# Patient Record
Sex: MALE | Race: WHITE | Employment: OTHER | URBAN - METROPOLITAN AREA
[De-identification: names, ages, dates, MRNs, and addresses within clinical notes are randomized per-mention and may not be internally consistent; named-entity substitution may affect disease eponyms.]

---

## 2019-09-09 ENCOUNTER — OFFICE VISIT (OUTPATIENT)
Dept: SURGERY | Age: 69
End: 2019-09-09

## 2019-09-09 VITALS
DIASTOLIC BLOOD PRESSURE: 72 MMHG | WEIGHT: 203 LBS | HEART RATE: 80 BPM | RESPIRATION RATE: 18 BRPM | SYSTOLIC BLOOD PRESSURE: 109 MMHG | HEIGHT: 73 IN | BODY MASS INDEX: 26.9 KG/M2 | OXYGEN SATURATION: 94 %

## 2019-09-09 DIAGNOSIS — E32.8 THYMIC CYST (HCC): Primary | ICD-10-CM

## 2019-09-09 RX ORDER — OXYBUTYNIN CHLORIDE 10 MG/1
TABLET, EXTENDED RELEASE ORAL
Refills: 6 | COMMUNITY
Start: 2019-07-28

## 2019-09-09 RX ORDER — TAMSULOSIN HYDROCHLORIDE 0.4 MG/1
0.8 CAPSULE ORAL DAILY
COMMUNITY

## 2019-09-09 RX ORDER — PROPRANOLOL HYDROCHLORIDE 60 MG/1
60 TABLET ORAL 2 TIMES DAILY
COMMUNITY
End: 2020-09-08 | Stop reason: ALTCHOICE

## 2019-09-09 NOTE — PROGRESS NOTES
Asked to see Mr Vj Villaseñor re: mediastinal mass    Referred by Dr. Frida Jacob    Mr Vj Villaseñor is a pleasant 81 y/o gentleman with a past medical history significant for HTN, HLD and skin cancer. He presented to ENT with complaints of a neck mass near his sternal notch. Workup of this included a physical exam, Chest CT and an esophagram.  He reports that occasionally he feels pressure when he swallows. Denies acute pain. No Known Allergies    PMHx: HTN, HLD, skin cancer    PSHx: appendectomy, vasectomy    SocHx: quit smoking 2013    Family History   Problem Relation Age of Onset    Lung Cancer Mother     Heart Disease Father     Malignant Hyperthermia Neg Hx     Pseudocholinesterase Deficiency Neg Hx     Delayed Awakening Neg Hx     Post-op Nausea/Vomiting Neg Hx     Emergence Delirium Neg Hx     Post-op Cognitive Dysfunction Neg Hx     Other Neg Hx        Outpatient Medications Marked as Taking for the 9/9/19 encounter (Office Visit) with Dinorah Serrano MD   Medication Sig Dispense Refill    propranolol (INDERAL) 60 mg tablet Take 60 mg by mouth two (2) times a day.  tamsulosin (FLOMAX) 0.4 mg capsule Take 0.4 mg by mouth daily.  oxybutynin chloride XL (DITROPAN XL) 10 mg CR tablet TAKE 1 TABLET BY MOUTH EVERY DAY  6    Hydrochlorothiazide 12.5 mg tablet Take 12.5 mg by mouth daily (after breakfast).  aspirin 81 mg tablet Take 81 mg by mouth daily. ROS:    Constitutional- denies weight loss or gain  HEENT- pressure when swallowing  Neuro- denies syncope  Optho- no recent changes in vision  Resp- denies hemoptysis  CV- denies angina or palpitations  GI- denies abd pain  - no complaints  ID- denies recent fevers  Vasc- denies claudication    Blood pressure 109/72, pulse 80, resp. rate 18, height 6' 1\" (1.854 m), weight 203 lb (92.1 kg), SpO2 94 %.     On exam he is seated upright  Alert and oriented  Appears his stated age, well developed  Not tachypneic  Normal speech, normal affect  No cervical or supraclavicular lymphadenopathy  Skin overlying his sternal notch and manubrium is mobile but has a thickened leathery feel, no definitive nodules or masses  Lungs CTA b/l  Rrr, no murmurs  Radial pulses palp b/l  abd sntnd, no organomegaly  LE non edematous    ==============================    Labs pending    ==============================    I have personally reviewed his Chest CT and esophagram.  There is a 2cm benign appearing cyst in the superior anterior mediastinum abutting the innominate vessels. There is no definite findings which correlate to his overlying skin changes. Esophagram shows a tiny Zenker's    ==============================    PFTs- none    ==============================    Diagnoses  1: Thymic Cyst    =============================    Mr. Rosita Vásquez has an aysmptomatic, incidentally discovered 2cm thymic cyst which is benign appearing. I do not recommend surgical resection. I would get a repeat scan in 12 months; if there is no change we do not have to follow it. If there is any growth we can revisit surgical resection. I do not know what to make of his skin changes. He does have a history of skin cancer but there are no discernible discolorations or masses etc.  For now I would observe it clinically. If it continues to bother him we can certainly consider a skin or subcutaneous biopsy. He and his wife are comfortable with this plan. Thank you for allowing us to care for Mr Cecy Herrera spent 60 minutes with Dhara Mejía and their family, greater than 50% of which was spent in counseling and education reviewing their films, discussing surgical options and formulating a future care plan.

## 2019-09-09 NOTE — PROGRESS NOTES
New Patient. Lymphadenopathy. 1. Have you been to the ER, urgent care clinic since your last visit? Hospitalized since your last visit? No    2. Have you seen or consulted any other health care providers outside of the [address] [place] [address] since your last visit? Include any pap smears or colon screening. [facility].

## 2019-09-09 NOTE — Clinical Note
9/9/19 Patient: Mirtha Watts YOB: 1950 Date of Visit: 9/9/2019 Radha Kee MD 
VIA Dear Radha Kee MD, Thank you for referring Mr. Mirtha Watts to [address] [place] [address] for evaluation. My notes for this consultation are attached. If you have questions, please do not hesitate to call me. I look forward to following your patient along with you. Sincerely, Merlyn Fleming MD

## 2020-08-25 ENCOUNTER — HOSPITAL ENCOUNTER (OUTPATIENT)
Dept: CT IMAGING | Age: 70
Discharge: HOME | End: 2020-08-25
Attending: THORACIC SURGERY (CARDIOTHORACIC VASCULAR SURGERY)
Payer: MEDICARE

## 2020-08-25 DIAGNOSIS — E32.8 THYMIC CYST (HCC): ICD-10-CM

## 2020-08-25 LAB — CREAT BLD-MCNC: 1 MG/DL (ref 0.6–1.3)

## 2020-08-25 PROCEDURE — 82565 ASSAY OF CREATININE: CPT

## 2020-08-25 PROCEDURE — 74011000636 HC RX REV CODE- 636: Performed by: RADIOLOGY

## 2020-08-25 PROCEDURE — 71260 CT THORAX DX C+: CPT

## 2020-08-25 RX ADMIN — IOPAMIDOL 100 ML: 612 INJECTION, SOLUTION INTRAVENOUS at 09:12

## 2020-09-08 ENCOUNTER — OFFICE VISIT (OUTPATIENT)
Dept: SURGERY | Age: 70
End: 2020-09-08
Payer: MEDICARE

## 2020-09-08 VITALS
HEART RATE: 47 BPM | WEIGHT: 203 LBS | DIASTOLIC BLOOD PRESSURE: 83 MMHG | TEMPERATURE: 98 F | BODY MASS INDEX: 26.9 KG/M2 | SYSTOLIC BLOOD PRESSURE: 146 MMHG | OXYGEN SATURATION: 99 % | RESPIRATION RATE: 18 BRPM | HEIGHT: 73 IN

## 2020-09-08 DIAGNOSIS — E32.8 THYMIC CYST (HCC): Primary | ICD-10-CM

## 2020-09-08 PROCEDURE — G8419 CALC BMI OUT NRM PARAM NOF/U: HCPCS | Performed by: THORACIC SURGERY (CARDIOTHORACIC VASCULAR SURGERY)

## 2020-09-08 PROCEDURE — 3017F COLORECTAL CA SCREEN DOC REV: CPT | Performed by: THORACIC SURGERY (CARDIOTHORACIC VASCULAR SURGERY)

## 2020-09-08 PROCEDURE — 99213 OFFICE O/P EST LOW 20 MIN: CPT | Performed by: THORACIC SURGERY (CARDIOTHORACIC VASCULAR SURGERY)

## 2020-09-08 PROCEDURE — 1101F PT FALLS ASSESS-DOCD LE1/YR: CPT | Performed by: THORACIC SURGERY (CARDIOTHORACIC VASCULAR SURGERY)

## 2020-09-08 PROCEDURE — G8427 DOCREV CUR MEDS BY ELIG CLIN: HCPCS | Performed by: THORACIC SURGERY (CARDIOTHORACIC VASCULAR SURGERY)

## 2020-09-08 PROCEDURE — G8536 NO DOC ELDER MAL SCRN: HCPCS | Performed by: THORACIC SURGERY (CARDIOTHORACIC VASCULAR SURGERY)

## 2020-09-08 PROCEDURE — G8432 DEP SCR NOT DOC, RNG: HCPCS | Performed by: THORACIC SURGERY (CARDIOTHORACIC VASCULAR SURGERY)

## 2020-09-08 RX ORDER — RANOLAZINE 500 MG/1
1000 TABLET, EXTENDED RELEASE ORAL DAILY
COMMUNITY
Start: 2020-09-04

## 2020-09-08 RX ORDER — METOPROLOL TARTRATE 25 MG/1
25 TABLET, FILM COATED ORAL DAILY
COMMUNITY
Start: 2020-08-12

## 2020-09-08 RX ORDER — RIVAROXABAN 20 MG/1
TABLET, FILM COATED ORAL
COMMUNITY
Start: 2020-08-20

## 2020-09-08 NOTE — PROGRESS NOTES
Discuss CT results. 1. Have you been to the ER, urgent care clinic since your last visit? Hospitalized since your last visit? No    2. Have you seen or consulted any other health care providers outside of the [address] [place] [address] since your last visit? Include any pap smears or colon screening. Yes, Dr. Inga Esqueda, Cardiology.

## 2020-09-08 NOTE — PROGRESS NOTES
Mr Dayan Alcantara returns to clinic today to discuss the results of his repeat Chest CT. He was seen last year for the incidental finding of a thymic cyst.    No Known Allergies     In the past couple of months he has had a complete cardiac workup including stress test, echo and LHC for new onset Afib. No new surgical procedures. Current Outpatient Medications:     metoprolol tartrate (LOPRESSOR) 25 mg tablet, Take 25 mg by mouth daily. , Disp: , Rfl:     ranolazine ER (RANEXA) 500 mg SR tablet, Take 1,000 mg by mouth daily. , Disp: , Rfl:     Xarelto 20 mg tab tablet, TAKE 1 TABLET BY MOUTH EVERY DAY WITH FOOD, Disp: , Rfl:     tamsulosin (FLOMAX) 0.4 mg capsule, Take 0.8 mg by mouth daily. , Disp: , Rfl:     oxybutynin chloride XL (DITROPAN XL) 10 mg CR tablet, TAKE 1 TABLET BY MOUTH EVERY DAY, Disp: , Rfl: 6    Hydrochlorothiazide 12.5 mg tablet, Take 12.5 mg by mouth daily (after breakfast). , Disp: , Rfl:     aspirin 81 mg tablet, Take 81 mg by mouth daily. , Disp: , Rfl:            On exam he is seated upright  Alert and oriented  Appears his stated age, well developed  Not tachypneic  Normal speech, normal affect  No cervical or supraclavicular lymphadenopathy  Skin overlying his sternal notch and manubrium is mobile but has a thickened leathery feel, no definitive nodules or masses  Lungs CTA b/l  Rrr, no murmurs  Radial pulses palp b/l  abd sntnd, no organomegaly  LE non edematous     ==============================     Cr 1.0     ==============================     I have personally reviewed his Chest CT . The small superior thymic cyst is essentially unchanged in size or character.      ==============================     PFTs- none     ==============================     Diagnoses  1: Thymic Cyst     =============================     Mr. Foss has an aysmptomatic, incidentally discovered ~2cm thymic cyst which is benign appearing. It has not changed in 13 months.   I do not think he needs serial imaging or a resection. We will follow him clinically and he will follow up on an as needed basis.        Thank you for allowing us to care for Mr Conchita Fernandez     I spent 15 minutes with Conchita Fernandez and their family, greater than 50% of which was spent in counseling and education reviewing their films, discussing surgical options and formulating a future care plan.

## 2020-09-08 NOTE — LETTER
9/8/20 Patient: Evangelista Wells YOB: 1950 Date of Visit: 9/8/2020 Jordi Coronado MD 
[address] [facility] [address] VIA Facsimile: [phone] Dear Jordi Coronado MD, Thank you for referring Mr. Evangelista Wells to [address] [place] [address] for evaluation. My notes for this consultation are attached. If you have questions, please do not hesitate to call me. I look forward to following your patient along with you. Sincerely, Paz Villela MD

## 2021-11-19 ENCOUNTER — HOSPITAL ENCOUNTER (OUTPATIENT)
Dept: LAB | Age: 71
Discharge: HOME | End: 2021-11-19
Payer: MEDICARE

## 2021-11-19 ENCOUNTER — TRANSCRIBE ORDER (OUTPATIENT)
Dept: REGISTRATION | Age: 71
End: 2021-11-19

## 2021-11-19 DIAGNOSIS — Z01.812 PRE-PROCEDURAL LABORATORY EXAMINATIONS: Primary | ICD-10-CM

## 2021-11-19 DIAGNOSIS — Z01.812 PRE-PROCEDURAL LABORATORY EXAMINATIONS: ICD-10-CM

## 2021-11-19 PROCEDURE — U0005 INFEC AGEN DETEC AMPLI PROBE: HCPCS

## 2021-11-20 LAB
SARS-COV-2, XPLCVT: NOT DETECTED
SOURCE, COVRS: NORMAL

## 2021-11-24 ENCOUNTER — ANESTHESIA (OUTPATIENT)
Dept: ENDOSCOPY | Age: 71
End: 2021-11-24
Payer: MEDICARE

## 2021-11-24 ENCOUNTER — ANESTHESIA EVENT (OUTPATIENT)
Dept: ENDOSCOPY | Age: 71
End: 2021-11-24
Payer: MEDICARE

## 2021-11-24 ENCOUNTER — HOSPITAL ENCOUNTER (OUTPATIENT)
Age: 71
Setting detail: OUTPATIENT SURGERY
Discharge: HOME | End: 2021-11-24
Attending: INTERNAL MEDICINE | Admitting: INTERNAL MEDICINE
Payer: MEDICARE

## 2021-11-24 VITALS
HEART RATE: 65 BPM | OXYGEN SATURATION: 100 % | HEIGHT: 72 IN | RESPIRATION RATE: 15 BRPM | WEIGHT: 209.66 LBS | SYSTOLIC BLOOD PRESSURE: 138 MMHG | BODY MASS INDEX: 28.4 KG/M2 | DIASTOLIC BLOOD PRESSURE: 71 MMHG | TEMPERATURE: 97.8 F

## 2021-11-24 PROCEDURE — 74011250636 HC RX REV CODE- 250/636: Performed by: NURSE ANESTHETIST, CERTIFIED REGISTERED

## 2021-11-24 PROCEDURE — 88305 TISSUE EXAM BY PATHOLOGIST: CPT

## 2021-11-24 PROCEDURE — 76040000019: Performed by: INTERNAL MEDICINE

## 2021-11-24 PROCEDURE — 77030013992 HC SNR POLYP ENDOSC BSC -B: Performed by: INTERNAL MEDICINE

## 2021-11-24 PROCEDURE — 74011000250 HC RX REV CODE- 250: Performed by: NURSE ANESTHETIST, CERTIFIED REGISTERED

## 2021-11-24 PROCEDURE — 2709999900 HC NON-CHARGEABLE SUPPLY: Performed by: INTERNAL MEDICINE

## 2021-11-24 PROCEDURE — 76060000031 HC ANESTHESIA FIRST 0.5 HR: Performed by: INTERNAL MEDICINE

## 2021-11-24 RX ORDER — LIDOCAINE HYDROCHLORIDE 20 MG/ML
INJECTION, SOLUTION EPIDURAL; INFILTRATION; INTRACAUDAL; PERINEURAL AS NEEDED
Status: DISCONTINUED | OUTPATIENT
Start: 2021-11-24 | End: 2021-11-24 | Stop reason: HOSPADM

## 2021-11-24 RX ORDER — DEXTROMETHORPHAN/PSEUDOEPHED 2.5-7.5/.8
1.2 DROPS ORAL
Status: DISCONTINUED | OUTPATIENT
Start: 2021-11-24 | End: 2021-11-24 | Stop reason: HOSPADM

## 2021-11-24 RX ORDER — EPINEPHRINE 0.1 MG/ML
1 INJECTION INTRACARDIAC; INTRAVENOUS
Status: DISCONTINUED | OUTPATIENT
Start: 2021-11-24 | End: 2021-11-24 | Stop reason: HOSPADM

## 2021-11-24 RX ORDER — FENTANYL CITRATE 50 UG/ML
100 INJECTION, SOLUTION INTRAMUSCULAR; INTRAVENOUS
Status: DISCONTINUED | OUTPATIENT
Start: 2021-11-24 | End: 2021-11-24 | Stop reason: HOSPADM

## 2021-11-24 RX ORDER — PROPOFOL 10 MG/ML
INJECTION, EMULSION INTRAVENOUS
Status: DISCONTINUED | OUTPATIENT
Start: 2021-11-24 | End: 2021-11-24 | Stop reason: HOSPADM

## 2021-11-24 RX ORDER — NALOXONE HYDROCHLORIDE 0.4 MG/ML
0.4 INJECTION, SOLUTION INTRAMUSCULAR; INTRAVENOUS; SUBCUTANEOUS
Status: DISCONTINUED | OUTPATIENT
Start: 2021-11-24 | End: 2021-11-24 | Stop reason: HOSPADM

## 2021-11-24 RX ORDER — FLUMAZENIL 0.1 MG/ML
0.2 INJECTION INTRAVENOUS
Status: DISCONTINUED | OUTPATIENT
Start: 2021-11-24 | End: 2021-11-24 | Stop reason: HOSPADM

## 2021-11-24 RX ORDER — ATROPINE SULFATE 0.1 MG/ML
0.5 INJECTION INTRAVENOUS
Status: DISCONTINUED | OUTPATIENT
Start: 2021-11-24 | End: 2021-11-24 | Stop reason: HOSPADM

## 2021-11-24 RX ORDER — SODIUM CHLORIDE 9 MG/ML
50 INJECTION, SOLUTION INTRAVENOUS CONTINUOUS
Status: DISCONTINUED | OUTPATIENT
Start: 2021-11-24 | End: 2021-11-24 | Stop reason: HOSPADM

## 2021-11-24 RX ORDER — MIDAZOLAM HYDROCHLORIDE 1 MG/ML
.25-5 INJECTION, SOLUTION INTRAMUSCULAR; INTRAVENOUS
Status: DISCONTINUED | OUTPATIENT
Start: 2021-11-24 | End: 2021-11-24 | Stop reason: HOSPADM

## 2021-11-24 RX ORDER — SODIUM CHLORIDE 9 MG/ML
INJECTION, SOLUTION INTRAVENOUS
Status: DISCONTINUED | OUTPATIENT
Start: 2021-11-24 | End: 2021-11-24 | Stop reason: HOSPADM

## 2021-11-24 RX ADMIN — LIDOCAINE HYDROCHLORIDE 50 MG: 20 INJECTION, SOLUTION INTRAVENOUS at 14:05

## 2021-11-24 RX ADMIN — SODIUM CHLORIDE: 9 INJECTION, SOLUTION INTRAVENOUS at 13:56

## 2021-11-24 RX ADMIN — PROPOFOL 500 MCG/KG/MIN: 10 INJECTION, EMULSION INTRAVENOUS at 14:10

## 2021-11-24 NOTE — PROGRESS NOTES
Endoscopy discharge instructions have been reviewed and given to patient. The patient verbalized understanding and acceptance of instructions. Dr. Rachel Card discussed with patient procedure findings and next steps.

## 2021-11-24 NOTE — DISCHARGE INSTRUCTIONS
[facility]  [id number]  1950    DISCHARGE INSTRUCTIONS    Results:diverticulosis, polyp    Recommendations:      - high fiber diet   Because of age, routine follow up exam not recommended    Discomfort:  Redness at IV site- apply warm compress to area; if redness or soreness persist- contact your physician. There may be a slight amount of blood passed from the rectum. Gaseous discomfort - walking, belching will help relieve any discomfort. You may not operate a vehicle for 12 hours. You may not engage in an occupation involving machinery or appliances for rest of today. You may not drink alcoholic beverages for at least 12 hours. Avoid making any critical decisions for at least 24 hours. DIET:   High fiber diet. Medications:                Resume usual medications today   ACTIVITY:  You may resume your normal daily activities it is recommended that you spend the remainder of the day resting -  avoid any strenuous activity. CALL M.D. ANY SIGN OF:   Increasing pain, nausea, vomiting  Abdominal distension (swelling)  New increased bleeding (oral or rectal)  Fever (chills)  Pain in chest area  Bloody discharge from nose or mouth  Shortness of breath     Follow-up Instructions:  Call Dr. Ellyn Parisi if you have any questions or problems.           DISCHARGE SUMMARY from Nurse    The following personal items collected during your admission are returned to you:   Dental Appliance: Dental Appliances: None  Vision: Visual Aid: Glasses  Hearing Aid:    Jewelry:    Clothing:    Other Valuables:    Valuables sent to safe:

## 2021-11-24 NOTE — ANESTHESIA PREPROCEDURE EVALUATION
Relevant Problems   No relevant active problems       Anesthetic History   No history of anesthetic complications            Review of Systems / Medical History  Patient summary reviewed and pertinent labs reviewed    Pulmonary  Within defined limits                 Neuro/Psych   Within defined limits        Pertinent negatives: No seizures, TIA and CVA   Cardiovascular    Hypertension        Dysrhythmias : atrial fibrillation  Hyperlipidemia  Pertinent negatives: No past MI, angina and CHF  Exercise tolerance: >4 METS  Comments: Afib on Xarelto. Appropriately held.     GI/Hepatic/Renal  Within defined limits           Pertinent negatives: No renal disease   Endo/Other  Within defined limits        Pertinent negatives: No diabetes   Other Findings              Physical Exam    Airway  Mallampati: II  TM Distance: 4 - 6 cm  Neck ROM: normal range of motion   Mouth opening: Normal     Cardiovascular      Rate: normal         Dental  No notable dental hx       Pulmonary  Breath sounds clear to auscultation               Abdominal         Other Findings            Anesthetic Plan    ASA: 3  Anesthesia type: MAC          Induction: Intravenous  Anesthetic plan and risks discussed with: Patient

## 2021-11-24 NOTE — PERIOP NOTES
Report from Lauri Gtz CRNA, see anesthesia record. ABD remains soft and non-tender post procedure. Pt has no complaints at this time and tolerated the procedure well.     Endoscope was pre-cleaned at bedside immediately following procedure by Alla.

## 2021-11-24 NOTE — PROCEDURES
[address]  [phone]      2021    Colonoscopy Procedure Note  Nikhil Proctor  :  1950  [facility] Medical Record Number: [id number]    Indications:     Screening colonoscopy  PCP:  Sahra Linder MD  Anesthesia/Sedation: see nursing notes  Endoscopist:  Dr. Dakota Sumner  Assistants: None  Complications:  None  Estimate Blood Loss:  None    Permit:  The indications, risks, benefits and alternatives were reviewed with the patient or their decision maker who was provided an opportunity to ask questions and all questions were answered. The specific risks of colonoscopy with conscious sedation were reviewed, including but not limited to anesthetic complication, bleeding, adverse drug reaction, missed lesion, infection, IV site reactions, and intestinal perforation which would lead to the need for surgical repair. Alternatives to colonoscopy including radiographic imaging, observation without testing, or laboratory testing were reviewed including the limitations of those alternatives. After considering the options and having all their questions answered, the patient or their decision maker provided both verbal and written consent to proceed. Procedure in Detail:  After obtaining informed consent, positioning of the patient in the left lateral decubitus position, and conduction of a pre-procedure pause or \"time out\" the endoscope was introduced into the anus and advanced to the cecum, which was identified by the ileocecal valve and appendiceal orifice. The quality of the colonic preparation was good. A careful inspection was made as the colonoscope was withdrawn, findings and interventions are described below.     Appendiceal orifice photographed    Findings:       - Diverticulosis too numerous to count with deformity descending, sigmoid; fewer in transverse  Single 8 mm sessile transverse polyp    Specimens:    polyp removed cold snare    Implants: none    Complications:   None; patient tolerated the procedure well. Estimated blood loss: none    Impression:  diverticulosis; single polyp    Recommendations:      - high fiber diet   Because of age, routine follow up exam not recommended    Thank you for entrusting me with this patient's care. Please do not hesitate to contact me with any questions or if I can be of assistance with any of your other patients' GI needs.     Signed By: Dara Louis MD                        November 24, 2021

## 2021-11-24 NOTE — PROGRESS NOTES
Diane Ernst  1950  [id number]    Situation:  Verbal report received from: Jennifer JEFFERSON   Procedure: Procedure(s):  COLONOSCOPY  ENDOSCOPIC POLYPECTOMY    Background:    Preoperative diagnosis: SCREENING  Postoperative diagnosis: Colon Polyp x 1  Diverticulosis    :  Dr. Clara Marin  Assistant(s): Endoscopy RN-1: Noemi Peoples RN  Endoscopy RN-2: Cheli Philippe RN    Specimens:   ID Type Source Tests Collected by Time Destination   1 : transverse Polyp Preservative   Kandi Oscar MD 11/24/2021 1428 Pathology     H. Pylori  no    Assessment:    Anesthesia gave intra-procedure sedation and medications, see anesthesia flow sheet no    Intravenous fluids: NS@ KVO     Vital signs stable     Abdominal assessment: round and soft     Recommendation:  Discharge patient per MD order.   Return to floor  Family or Friend   Permission to share finding with family or friend yes

## 2021-11-24 NOTE — H&P
Patient Name: Alexey Foss  2021  Gender: Male   (age): 1950 (72)    Referring Physician:   Avila Mcfarland MD  [address] [place], [place] [address]  [phone] (phone)  [phone] (fax)    Chief Complaint: Colon cancer screening     History of Present Illness:       Chronic anticoagulation after a fib ablation; day to day no symptoms He does not have chest pain, lower extremity swelling, difficulty breathing, abdominal pain, bleeding. In the past he has been able to temporarily discontinue anticoagulation for other routine maintenance items. Discussed the modes of colon cancer screening including colonoscopy versus stool tests; all questions answered    Past Medical History  Medical Conditions:   Arthritis  Atrial Fibrillation  COVID-19  High blood pressure  High cholesterol  Surgical Procedures:   CATH - Cardiac  Inguinal Hernia Repair  Appendectomy  Rotator Cuff Repair  Cardiac Ablation  Dx Studies:   Colonoscopy, 2011  Medications:   rzccqnd57 mgTake 1 tablet by mouth once a day  aemmkxtgue43 mgTake 1 tablet by mouth twice a day  avkughsigzwmwurkool18.5 mgTake 1 capsule by mouth once a day  lqilotbx48 mgTake 1 tablet by mouth once a day  metoprolol ibqinrqu08 mgTake 1 tablet by mouth once a day  oxybutynin lsjkcdas38 mgTake 1 tablet by mouth once a day  olexkxfdjt967 mgTake 1 tablet by mouth once a day  jbkwbhyuxpqh63 mgTake 1 tablet by mouth once a day  tamsulosin0.4 mgTake 1 capsule by mouth once a day  Fusndki77 mgTake 1 tablet by mouth once a day  Allergies:   Adhesive Tape  Immunizations:   COVID Vaccine, 2021  Influenza, seasonal, injectable, 10/01/2020  Influenza vaccination, 10/01/2019  Influenza vaccination (refused)  Social History  Alcohol:   Alcohol consumption: Daily. Tobacco:   Former smoker  Drugs:   None  Exercise:   Exercise 3 or more times a week. Caffeine:   Daily.   Marital Status:    Unknown     Occupation:    Retired Family History   No history of Colon Cancer, Colon Polyps, Esophogeal Cancer, GI Cancers, IBD (Crohn's or UC), Liver disease  Review of Systems:  Cardiovascular: Denies chest pain, irregular heart beat, palpitations, peripheral edema, syncope, Sweats. Constitutional: Denies fatigue, fever, loss of appetite, weight gain, weight loss. ENMT: Denies nose bleeds, sore throat, hearing loss. Endocrine: Denies excessive thirst, heat intolerance. Eyes: Denies loss of vision. Gastrointestinal: Denies abdominal pain, abdominal swelling, change in bowel habits, constipation, diarrhea, Bloating/gas, heartburn, jaundice, nausea, rectal bleeding, stomach cramps, vomiting, dysphagia, rectal pain, Stool incontinence, hematemesis. Genitourinary: Denies dark urine, dysuria, frequent urination, hematuria, incontinence. Hematologic/Lymphatic: Denies easy bruising, prolonged bleeding. Integumentary: Denies itching, rashes, sun sensitivity. Musculoskeletal: Denies arthritis, back pain, gout, joint pain, muscle weakness, stiffness. Neurological: Denies dizziness, fainting, frequent headaches, memory loss. Psychiatric: Denies anxiety, depression, difficulty sleeping, hallucinations, nervousness, panic attacks, paranoia. Respiratory: Denies cough, dyspnea, wheezing. Vital Signs: See nursing notes     physical Exam:  Constitutional:  Appearance: No distress, appears comfortable. Skin:  Inspection: No rash, no jaundice. Head/face: Inspection: Normacephalic, atraumatic. Eyes:  Conjunctivae/lids: Normal.  ENMT:  External: Normal.  Neck:  Neck: Normal appearance, trachea midline. Respiratory:  Effort: Normal respiratory effort, comfortable, speaks in complete sentences. Auscultation: normal breath sounds, no rubs, wheezes or rhonchi. Cardiovascular: Auscultation: normal, S1 and S2, no gallops , no rubs or murmurs . Gastrointestinal/Abdomen:  Abdomen: non-distended, nontender.   Liver/Spleen: normal, normal size, Liver size and consistency normal, spleen is non-palpable. Musculoskeletal:  Gait/station: normal.  Muscles: normal strength and tone, no atrophy or abnormal movements. Psychiatric:  Judgment/insight: Normal, normal judgement, normal insight. Mood and affect: Normal mood, affect full, no evidence of depression, anxiety or agitation. Impressions:   Chronic atrial fibrillation  Long term (current) use of anticoagulants  Screening colonoscopy (Screening for colonic neoplasia)    Plan:   TriLyte With Flavor Packets 420 gram Take as directed  Colonoscopy stop Xeralto three days before exam  I've discussed colonoscopy possible biopsy, polypectomy, cautery, injection, alternatives, complications including but not limited to pain, cardiopulmonary event, bleeding, perforation requiring additional blood transfusion or operative repair; all questions answered.

## 2021-11-24 NOTE — ANESTHESIA POSTPROCEDURE EVALUATION
Procedure(s):  COLONOSCOPY  ENDOSCOPIC POLYPECTOMY. MAC    Anesthesia Post Evaluation        Patient location during evaluation: PACU  Patient participation: complete - patient participated  Level of consciousness: sleepy but conscious  Pain management: adequate  Airway patency: patent  Anesthetic complications: no  Cardiovascular status: acceptable and stable  Respiratory status: acceptable and unassisted  Hydration status: acceptable  Comments: The patient was seen and evaluated in the post-operative period. The time of my evaluation may not match the time of this note. The patient denied uncontrolled pain or nausea, and there were no significant complications evident. Catie Harrell MD      Post anesthesia nausea and vomiting:  none  Final Post Anesthesia Temperature Assessment:  Normothermia (36.0-37.5 degrees C)      INITIAL Post-op Vital signs:   Vitals Value Taken Time   /64 11/24/21 1446   Temp 36.6 °C (97.8 °F) 11/24/21 1440   Pulse 60 11/24/21 1447   Resp 15 11/24/21 1447   SpO2 100 % 11/24/21 1447   Vitals shown include unvalidated device data.

## (undated) DEVICE — BAG SPEC BIOHZRD 10 X 10 IN --

## (undated) DEVICE — SOLIDIFIER MEDC 1200ML -- CONVERT TO 356117

## (undated) DEVICE — SNARE ENDOSCP M L240CM W27MM SHTH DIA2.4MM CHN 2.8MM OVL

## (undated) DEVICE — 1200 GUARD II KIT W/5MM TUBE W/O VAC TUBE: Brand: GUARDIAN

## (undated) DEVICE — ELECTRODE,RADIOTRANSLUCENT,FOAM,3PK: Brand: MEDLINE

## (undated) DEVICE — SET GRAV CK VLV NEEDLESS ST 3 GANGED 4WAY STPCOCK HI FLO 10

## (undated) DEVICE — SYRINGE 50ML E/T

## (undated) DEVICE — POLYP TRAP: Brand: TRAPEASE®

## (undated) DEVICE — CATH IV AUTOGRD BC PNK 20GA 25 -- INSYTE

## (undated) DEVICE — BAG BELONG PT PERS CLEAR HANDL

## (undated) DEVICE — CANN NASAL O2 CAPNOGRAPHY AD -- FILTERLINE

## (undated) DEVICE — CONTAINER SPEC 20 ML LID NEUT BUFF FORMALIN 10 % POLYPR STS

## (undated) DEVICE — CUFF RMFG BP INF SZ 11 DISP -- LAWSON OEM ITEM 238915

## (undated) DEVICE — 3M™ CUROS™ DISINFECTING CAP FOR NEEDLELESS CONNECTORS 270/CARTON 20 CARTONS/CASE CFF1-270: Brand: CUROS™

## (undated) DEVICE — (D)SENSOR RMFG 02 PULS OXMTR -- DISC BY MFR USE ITEM 133445

## (undated) DEVICE — KIT COLON W/ 1.1OZ LUB AND 2 END

## (undated) DEVICE — Device